# Patient Record
Sex: MALE | Race: WHITE | NOT HISPANIC OR LATINO | Employment: UNEMPLOYED | URBAN - METROPOLITAN AREA
[De-identification: names, ages, dates, MRNs, and addresses within clinical notes are randomized per-mention and may not be internally consistent; named-entity substitution may affect disease eponyms.]

---

## 2017-05-23 ENCOUNTER — OFFICE VISIT (OUTPATIENT)
Dept: ENDOCRINOLOGY | Age: 22
End: 2017-05-23

## 2017-05-23 VITALS
WEIGHT: 180.4 LBS | OXYGEN SATURATION: 96 % | BODY MASS INDEX: 25.26 KG/M2 | HEIGHT: 71 IN | SYSTOLIC BLOOD PRESSURE: 130 MMHG | HEART RATE: 89 BPM | DIASTOLIC BLOOD PRESSURE: 80 MMHG

## 2017-05-23 DIAGNOSIS — E06.3 HASHIMOTO'S DISEASE: ICD-10-CM

## 2017-05-23 DIAGNOSIS — E03.9 HYPOTHYROIDISM, UNSPECIFIED TYPE: Primary | ICD-10-CM

## 2017-05-23 DIAGNOSIS — R63.5 ABNORMAL WEIGHT GAIN: ICD-10-CM

## 2017-05-23 DIAGNOSIS — E55.9 VITAMIN D DEFICIENCY: ICD-10-CM

## 2017-05-23 DIAGNOSIS — R53.82 CHRONIC FATIGUE: ICD-10-CM

## 2017-05-23 DIAGNOSIS — E04.2 NONTOXIC MULTINODULAR GOITER: ICD-10-CM

## 2017-05-23 PROCEDURE — 99205 OFFICE O/P NEW HI 60 MIN: CPT | Performed by: INTERNAL MEDICINE

## 2017-05-23 RX ORDER — FAMOTIDINE 20 MG
1 TABLET ORAL
COMMUNITY

## 2017-05-23 RX ORDER — METHYLPHENIDATE HYDROCHLORIDE 36 MG/1
36 TABLET ORAL EVERY MORNING
COMMUNITY

## 2017-05-23 RX ORDER — LEVOTHYROXINE SODIUM 0.07 MG/1
75 TABLET ORAL DAILY
COMMUNITY
End: 2017-12-15 | Stop reason: SDUPTHER

## 2017-05-24 LAB
ALBUMIN SERPL-MCNC: 5.1 G/DL (ref 3.5–5.2)
ALBUMIN/GLOB SERPL: 2 G/DL
ALP SERPL-CCNC: 60 U/L (ref 39–117)
ALT SERPL-CCNC: 36 U/L (ref 1–41)
AST SERPL-CCNC: 26 U/L (ref 1–40)
BILIRUB SERPL-MCNC: 0.5 MG/DL (ref 0.1–1.2)
BUN SERPL-MCNC: 10 MG/DL (ref 6–20)
BUN/CREAT SERPL: 10.6 (ref 7–25)
CALCIUM SERPL-MCNC: 10.2 MG/DL (ref 8.6–10.5)
CHLORIDE SERPL-SCNC: 98 MMOL/L (ref 98–107)
CO2 SERPL-SCNC: 26.7 MMOL/L (ref 22–29)
CREAT SERPL-MCNC: 0.94 MG/DL (ref 0.76–1.27)
GLOBULIN SER CALC-MCNC: 2.5 GM/DL
GLUCOSE SERPL-MCNC: 93 MG/DL (ref 65–99)
POTASSIUM SERPL-SCNC: 4 MMOL/L (ref 3.5–5.2)
PROT SERPL-MCNC: 7.6 G/DL (ref 6–8.5)
SODIUM SERPL-SCNC: 141 MMOL/L (ref 136–145)
T3 SERPL-MCNC: 128.5 NG/DL (ref 80–200)
T4 FREE SERPL-MCNC: 1.69 NG/DL (ref 0.93–1.7)
THYROPEROXIDASE AB SERPL-ACNC: 151 IU/ML (ref 0–34)
TSH SERPL DL<=0.005 MIU/L-ACNC: 2.78 MIU/ML (ref 0.27–4.2)

## 2017-05-31 PROBLEM — R53.82 CHRONIC FATIGUE: Status: ACTIVE | Noted: 2017-05-31

## 2017-05-31 PROBLEM — E55.9 VITAMIN D DEFICIENCY: Status: ACTIVE | Noted: 2017-05-31

## 2017-11-14 ENCOUNTER — OFFICE VISIT (OUTPATIENT)
Dept: FAMILY MEDICINE CLINIC | Facility: CLINIC | Age: 22
End: 2017-11-14

## 2017-11-14 VITALS
DIASTOLIC BLOOD PRESSURE: 70 MMHG | TEMPERATURE: 98.5 F | WEIGHT: 163 LBS | OXYGEN SATURATION: 98 % | HEIGHT: 71 IN | BODY MASS INDEX: 22.82 KG/M2 | SYSTOLIC BLOOD PRESSURE: 120 MMHG | HEART RATE: 88 BPM

## 2017-11-14 DIAGNOSIS — Z00.00 HEALTH CARE MAINTENANCE: Primary | ICD-10-CM

## 2017-11-14 DIAGNOSIS — E03.9 HYPOTHYROIDISM, UNSPECIFIED TYPE: ICD-10-CM

## 2017-11-14 DIAGNOSIS — Z00.00 ANNUAL PHYSICAL EXAM: ICD-10-CM

## 2017-11-14 DIAGNOSIS — E55.9 VITAMIN D DEFICIENCY: ICD-10-CM

## 2017-11-14 DIAGNOSIS — F90.2 ADHD (ATTENTION DEFICIT HYPERACTIVITY DISORDER), COMBINED TYPE: ICD-10-CM

## 2017-11-14 PROCEDURE — 99395 PREV VISIT EST AGE 18-39: CPT | Performed by: FAMILY MEDICINE

## 2017-11-14 NOTE — PROGRESS NOTES
Subjective   Ceasar Wang is a 22 y.o. male with   Chief Complaint   Patient presents with   • Annual Exam     re-establish.  Going to be going to [place] next year for a year.   .    History of Present Illness     Patient presents today for a complete physical.  Patient sees Endocrinology for Thyroid and he sees Dr. Albarran for ADHD.  At this time, he is without further complaint. 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  He currently denies any acute complaint.    The following portions of the patient's history were reviewed and updated as appropriate: allergies, current medications, past family history, past medical history, past social history, past surgical history and problem list.    Review of Systems   Endocrine: Negative for cold intolerance and heat intolerance.        Hypothyroidism     Psychiatric/Behavioral: Positive for decreased concentration (ADHD improved with medication).   All other systems reviewed and are negative.      Objective     Vitals:    11/14/17 1324   BP: 120/70   Pulse: 88   Temp: 98.5 °F (36.9 °C)   SpO2: 98%     BP Readings from Last 3 Encounters:   11/14/17 120/70   05/23/17 130/80   12/19/13 110/70      Wt Readings from Last 3 Encounters:   11/14/17 163 lb (73.9 kg)   05/23/17 180 lb 6.4 oz (81.8 kg)   12/19/13 155 lb 0.1 oz (70.3 kg) (63 %, Z= 0.34)*     * Growth percentiles are based on [facility] 2-20 Years data.        Physical Exam   Constitutional: He is oriented to person, place, and time. Vital signs are normal. He appears well-developed and well-nourished. No distress.   HENT:   Head: Normocephalic and atraumatic.   Right Ear: Hearing, tympanic membrane, external ear and ear canal normal.   Left Ear: Hearing, tympanic membrane, external ear and ear canal normal.   Nose: Nose normal.   Mouth/Throat: Uvula is midline, oropharynx is clear and moist and mucous membranes are normal.   Eyes: Conjunctivae, EOM and lids are normal. Pupils are equal, round, and  reactive to light. No scleral icterus.   Fundoscopic exam:       The right eye shows no AV nicking, no exudate, no hemorrhage and no papilledema.        The left eye shows no AV nicking, no exudate, no hemorrhage and no papilledema.   Neck: Trachea normal and normal range of motion. Neck supple. No JVD present. No muscular tenderness present. Carotid bruit is not present. Normal range of motion present. No thyroid mass and no thyromegaly present.   Cardiovascular: Normal rate, regular rhythm, S1 normal, S2 normal, normal heart sounds, intact distal pulses and normal pulses.  PMI is not displaced.  Exam reveals no gallop and no friction rub.    No murmur heard.  Pulses:       Carotid pulses are 2+ on the right side, and 2+ on the left side.       Radial pulses are 2+ on the right side, and 2+ on the left side.   Pulmonary/Chest: Effort normal and breath sounds normal. He has no decreased breath sounds. He has no wheezes. He has no rhonchi. He has no rales.   Abdominal: Soft. Bowel sounds are normal. He exhibits no distension and no mass. There is no hepatosplenomegaly. There is no tenderness. There is no rigidity, no rebound, no guarding and no CVA tenderness. No hernia.   Musculoskeletal: Normal range of motion. He exhibits no edema, tenderness or deformity.   Lymphadenopathy:     He has no cervical adenopathy.   Neurological: He is alert and oriented to person, place, and time. He has normal strength and normal reflexes. He displays no tremor and normal reflexes. No cranial nerve deficit or sensory deficit. He exhibits normal muscle tone. He displays a negative Romberg sign. Coordination and gait normal.   Reflex Scores:       Bicep reflexes are 2+ on the right side and 2+ on the left side.       Brachioradialis reflexes are 2+ on the right side and 2+ on the left side.       Patellar reflexes are 2+ on the right side and 2+ on the left side.  Skin: Skin is warm and dry. No rash noted.   Psychiatric: He has a  normal mood and affect. His speech is normal and behavior is normal. Judgment and thought content normal. Cognition and memory are normal.   Nursing note and vitals reviewed.      Assessment/Plan   Ceasar was seen today for annual exam.    Diagnoses and all orders for this visit:    Health care maintenance  -     CBC & Differential; Future  -     Comprehensive Metabolic Panel; Future  -     Lipid Panel; Future  -     TSH; Future  -     Vitamin D 25 Hydroxy; Future    Annual physical exam  -     CBC & Differential; Future  -     Comprehensive Metabolic Panel; Future  -     Lipid Panel; Future  -     TSH; Future  -     Vitamin D 25 Hydroxy; Future    Hypothyroidism, unspecified type    ADHD (attention deficit hyperactivity disorder), combined type    Vitamin D deficiency        Return in about 1 year (around 11/14/2018) for Annual.    Scribed for Robert Ervin MD by Arun Botello. 11/14/2017    IRobert MD personally performed the services described in this documentation, as scribed by Arun Botello in my presence, and it is both accurate and complete

## 2017-12-15 RX ORDER — LEVOTHYROXINE SODIUM 0.07 MG/1
75 TABLET ORAL DAILY
Qty: 30 TABLET | Refills: 1 | Status: SHIPPED | OUTPATIENT
Start: 2017-12-15 | End: 2018-01-10 | Stop reason: SDUPTHER

## 2018-01-10 RX ORDER — LEVOTHYROXINE SODIUM 0.07 MG/1
TABLET ORAL
Qty: 30 TABLET | Refills: 0 | Status: SHIPPED | OUTPATIENT
Start: 2018-01-10 | End: 2018-02-05 | Stop reason: SDUPTHER

## 2018-02-05 RX ORDER — LEVOTHYROXINE SODIUM 0.07 MG/1
TABLET ORAL
Qty: 30 TABLET | Refills: 0 | Status: SHIPPED | OUTPATIENT
Start: 2018-02-05 | End: 2018-02-20 | Stop reason: SDUPTHER

## 2018-02-13 ENCOUNTER — LAB (OUTPATIENT)
Dept: ENDOCRINOLOGY | Age: 23
End: 2018-02-13

## 2018-02-13 DIAGNOSIS — E03.9 HYPOTHYROIDISM, UNSPECIFIED TYPE: ICD-10-CM

## 2018-02-13 DIAGNOSIS — E03.9 HYPOTHYROIDISM, UNSPECIFIED TYPE: Primary | ICD-10-CM

## 2018-02-13 LAB
ALBUMIN SERPL-MCNC: 4.5 G/DL (ref 3.5–5.2)
ALBUMIN/GLOB SERPL: 1.6 G/DL
ALP SERPL-CCNC: 51 U/L (ref 39–117)
ALT SERPL-CCNC: 19 U/L (ref 1–41)
AST SERPL-CCNC: 18 U/L (ref 1–40)
BILIRUB SERPL-MCNC: 0.3 MG/DL (ref 0.1–1.2)
BUN SERPL-MCNC: 13 MG/DL (ref 6–20)
BUN/CREAT SERPL: 15.5 (ref 7–25)
CALCIUM SERPL-MCNC: 9.8 MG/DL (ref 8.6–10.5)
CHLORIDE SERPL-SCNC: 100 MMOL/L (ref 98–107)
CO2 SERPL-SCNC: 27.6 MMOL/L (ref 22–29)
CREAT SERPL-MCNC: 0.84 MG/DL (ref 0.76–1.27)
GFR SERPLBLD CREATININE-BSD FMLA CKD-EPI: 115 ML/MIN/1.73
GFR SERPLBLD CREATININE-BSD FMLA CKD-EPI: 140 ML/MIN/1.73
GLOBULIN SER CALC-MCNC: 2.8 GM/DL
GLUCOSE SERPL-MCNC: 98 MG/DL (ref 65–99)
POTASSIUM SERPL-SCNC: 4.6 MMOL/L (ref 3.5–5.2)
PROT SERPL-MCNC: 7.3 G/DL (ref 6–8.5)
SODIUM SERPL-SCNC: 142 MMOL/L (ref 136–145)
T4 FREE SERPL-MCNC: 1.89 NG/DL (ref 0.93–1.7)
TSH SERPL DL<=0.005 MIU/L-ACNC: 2.72 MIU/ML (ref 0.27–4.2)

## 2018-02-20 ENCOUNTER — OFFICE VISIT (OUTPATIENT)
Dept: ENDOCRINOLOGY | Age: 23
End: 2018-02-20

## 2018-02-20 VITALS
BODY MASS INDEX: 22.23 KG/M2 | WEIGHT: 158.8 LBS | DIASTOLIC BLOOD PRESSURE: 70 MMHG | SYSTOLIC BLOOD PRESSURE: 118 MMHG | HEART RATE: 103 BPM | HEIGHT: 71 IN

## 2018-02-20 DIAGNOSIS — E06.3 HASHIMOTO'S DISEASE: ICD-10-CM

## 2018-02-20 DIAGNOSIS — E03.9 HYPOTHYROIDISM, UNSPECIFIED TYPE: Primary | ICD-10-CM

## 2018-02-20 DIAGNOSIS — R63.5 ABNORMAL WEIGHT GAIN: ICD-10-CM

## 2018-02-20 DIAGNOSIS — E04.2 NONTOXIC MULTINODULAR GOITER: ICD-10-CM

## 2018-02-20 DIAGNOSIS — R53.82 CHRONIC FATIGUE: ICD-10-CM

## 2018-02-20 PROCEDURE — 99214 OFFICE O/P EST MOD 30 MIN: CPT | Performed by: INTERNAL MEDICINE

## 2018-02-20 RX ORDER — LEVOTHYROXINE SODIUM 0.07 MG/1
75 TABLET ORAL DAILY
Qty: 30 TABLET | Refills: 12 | Status: SHIPPED | OUTPATIENT
Start: 2018-02-20 | End: 2019-08-01 | Stop reason: SDUPTHER

## 2018-04-04 ENCOUNTER — TELEPHONE (OUTPATIENT)
Dept: FAMILY MEDICINE CLINIC | Facility: CLINIC | Age: 23
End: 2018-04-04

## 2018-04-04 NOTE — TELEPHONE ENCOUNTER
Pt called Dr. Jefferson . He was wanting to know about getting blood work for a form that he brought in.

## 2018-04-05 NOTE — TELEPHONE ENCOUNTER
Pts employer is requesting he have some labs before going to [place] to work.  He only has paperwork as to what he needs.  Can we do that here?

## 2018-04-10 DIAGNOSIS — Z01.89 PATIENT REQUESTED DIAGNOSTIC TESTING: Primary | ICD-10-CM

## 2018-04-11 DIAGNOSIS — Z01.89 PATIENT REQUESTED DIAGNOSTIC TESTING: ICD-10-CM

## 2018-04-13 LAB
HIV 1+2 AB+HIV1 P24 AG SERPL QL IA: NON REACTIVE
RPR SER QL: NORMAL

## 2018-10-14 ENCOUNTER — RESULTS ENCOUNTER (OUTPATIENT)
Dept: FAMILY MEDICINE CLINIC | Facility: CLINIC | Age: 23
End: 2018-10-14

## 2018-10-14 DIAGNOSIS — Z00.00 HEALTH CARE MAINTENANCE: ICD-10-CM

## 2018-10-14 DIAGNOSIS — Z00.00 ANNUAL PHYSICAL EXAM: ICD-10-CM

## 2019-07-24 LAB
25(OH)D3+25(OH)D2 SERPL-MCNC: 44.2 NG/ML (ref 30–100)
ALBUMIN SERPL-MCNC: 4.9 G/DL (ref 3.5–5.2)
ALBUMIN/GLOB SERPL: 2.3 G/DL
ALP SERPL-CCNC: 53 U/L (ref 39–117)
ALT SERPL-CCNC: 8 U/L (ref 1–41)
AST SERPL-CCNC: 13 U/L (ref 1–40)
BASOPHILS # BLD AUTO: 0.02 10*3/MM3 (ref 0–0.2)
BASOPHILS NFR BLD AUTO: 0.5 % (ref 0–1.5)
BILIRUB SERPL-MCNC: 0.6 MG/DL (ref 0.2–1.2)
BUN SERPL-MCNC: 9 MG/DL (ref 6–20)
BUN/CREAT SERPL: 11.1 (ref 7–25)
CALCIUM SERPL-MCNC: 9.5 MG/DL (ref 8.6–10.5)
CHLORIDE SERPL-SCNC: 100 MMOL/L (ref 98–107)
CHOLEST SERPL-MCNC: 129 MG/DL (ref 0–200)
CO2 SERPL-SCNC: 27.5 MMOL/L (ref 22–29)
CREAT SERPL-MCNC: 0.81 MG/DL (ref 0.76–1.27)
EOSINOPHIL # BLD AUTO: 0.07 10*3/MM3 (ref 0–0.4)
EOSINOPHIL NFR BLD AUTO: 1.6 % (ref 0.3–6.2)
ERYTHROCYTE [DISTWIDTH] IN BLOOD BY AUTOMATED COUNT: 11.9 % (ref 12.3–15.4)
GLOBULIN SER CALC-MCNC: 2.1 GM/DL
GLUCOSE SERPL-MCNC: 89 MG/DL (ref 65–99)
HCT VFR BLD AUTO: 46 % (ref 37.5–51)
HDLC SERPL-MCNC: 45 MG/DL (ref 40–60)
HGB BLD-MCNC: 14.7 G/DL (ref 13–17.7)
IMM GRANULOCYTES # BLD AUTO: 0 10*3/MM3 (ref 0–0.05)
IMM GRANULOCYTES NFR BLD AUTO: 0 % (ref 0–0.5)
LDLC SERPL CALC-MCNC: 73 MG/DL (ref 0–100)
LYMPHOCYTES # BLD AUTO: 1.16 10*3/MM3 (ref 0.7–3.1)
LYMPHOCYTES NFR BLD AUTO: 26.6 % (ref 19.6–45.3)
MCH RBC QN AUTO: 29.2 PG (ref 26.6–33)
MCHC RBC AUTO-ENTMCNC: 32 G/DL (ref 31.5–35.7)
MCV RBC AUTO: 91.5 FL (ref 79–97)
MONOCYTES # BLD AUTO: 0.44 10*3/MM3 (ref 0.1–0.9)
MONOCYTES NFR BLD AUTO: 10.1 % (ref 5–12)
NEUTROPHILS # BLD AUTO: 2.67 10*3/MM3 (ref 1.7–7)
NEUTROPHILS NFR BLD AUTO: 61.2 % (ref 42.7–76)
NRBC BLD AUTO-RTO: 0 /100 WBC (ref 0–0.2)
PLATELET # BLD AUTO: 236 10*3/MM3 (ref 140–450)
POTASSIUM SERPL-SCNC: 4.4 MMOL/L (ref 3.5–5.2)
PROT SERPL-MCNC: 7 G/DL (ref 6–8.5)
RBC # BLD AUTO: 5.03 10*6/MM3 (ref 4.14–5.8)
SODIUM SERPL-SCNC: 139 MMOL/L (ref 136–145)
TRIGL SERPL-MCNC: 56 MG/DL (ref 0–150)
TSH SERPL DL<=0.005 MIU/L-ACNC: 2.32 MIU/ML (ref 0.27–4.2)
VLDLC SERPL CALC-MCNC: 11.2 MG/DL
WBC # BLD AUTO: 4.36 10*3/MM3 (ref 3.4–10.8)

## 2019-07-31 ENCOUNTER — OFFICE VISIT (OUTPATIENT)
Dept: FAMILY MEDICINE CLINIC | Facility: CLINIC | Age: 24
End: 2019-07-31

## 2019-07-31 VITALS
WEIGHT: 164.7 LBS | BODY MASS INDEX: 23.06 KG/M2 | DIASTOLIC BLOOD PRESSURE: 83 MMHG | SYSTOLIC BLOOD PRESSURE: 126 MMHG | HEART RATE: 93 BPM | OXYGEN SATURATION: 98 % | HEIGHT: 71 IN

## 2019-07-31 DIAGNOSIS — E03.9 ACQUIRED HYPOTHYROIDISM: ICD-10-CM

## 2019-07-31 DIAGNOSIS — Z00.01 ENCOUNTER FOR WELL ADULT EXAM WITH ABNORMAL FINDINGS: Primary | ICD-10-CM

## 2019-07-31 DIAGNOSIS — E55.9 VITAMIN D DEFICIENCY: ICD-10-CM

## 2019-07-31 PROCEDURE — 99395 PREV VISIT EST AGE 18-39: CPT | Performed by: FAMILY MEDICINE

## 2019-08-01 ENCOUNTER — OFFICE VISIT (OUTPATIENT)
Dept: ENDOCRINOLOGY | Age: 24
End: 2019-08-01

## 2019-08-01 VITALS
HEIGHT: 71 IN | SYSTOLIC BLOOD PRESSURE: 112 MMHG | DIASTOLIC BLOOD PRESSURE: 70 MMHG | HEART RATE: 79 BPM | WEIGHT: 163.6 LBS | BODY MASS INDEX: 22.9 KG/M2

## 2019-08-01 DIAGNOSIS — E03.9 ACQUIRED HYPOTHYROIDISM: Primary | ICD-10-CM

## 2019-08-01 DIAGNOSIS — R53.82 CHRONIC FATIGUE: ICD-10-CM

## 2019-08-01 DIAGNOSIS — E06.3 HASHIMOTO'S DISEASE: ICD-10-CM

## 2019-08-01 DIAGNOSIS — E04.2 NONTOXIC MULTINODULAR GOITER: ICD-10-CM

## 2019-08-01 DIAGNOSIS — E55.9 VITAMIN D DEFICIENCY: ICD-10-CM

## 2019-08-01 PROCEDURE — 99214 OFFICE O/P EST MOD 30 MIN: CPT | Performed by: INTERNAL MEDICINE

## 2019-08-01 RX ORDER — LEVOTHYROXINE SODIUM 0.07 MG/1
75 TABLET ORAL DAILY
Qty: 30 TABLET | Refills: 12 | Status: SHIPPED | OUTPATIENT
Start: 2019-08-01 | End: 2019-09-03 | Stop reason: SDUPTHER

## 2019-08-01 NOTE — PROGRESS NOTES
24 y.o.    Patient Care Team:  Robert Ervin DO as PCP - General (Family Medicine)    Chief Complaint:      F/U HYPOTHYROIDISM.  HERE TO DISCUSS LAB RESULTS.     Subjective     HPI   Patient is a 24-year-old white male with a history of hypothyroidism and multinodular goiter came for follow-up  Hypothyroidism  Patient is currently taking levothyroxine 75 mcg daily.  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 Is currently taking levothyroxine 75 mcg daily. He tries to take it on empty stomach for the most part.  He was diagnosed with hypothyroidism approximately 6 years ago.  He was also diagnosed with Hashimoto's thyroiditis.  Patient reports that he has been gaining weight in the past 2 months nearly 11 pounds.  He exercises on a daily basis and is reasonably active.  Fatigue.  Patient reports significant fatigue over the past several months.  He used to be on Concerta for 4 years and is still continuing from primary care.  Multinodular goiter.  Patient apparently had a thyroid ultrasound, which confirmed multinodular goiter. He also had radioactive iodine uptake and scan, which was slightly elevated     Patient denies any symptoms of hyper-or hypothyroidism at this point  Patient had an ultrasound 2 years ago along with the nuclear scan  Ultrasound reportedly  "showed a multinodular goiter  Nuclear scan had a slightly increased uptake at 39.6%        The following portions of the patient's history were reviewed and updated as appropriate: allergies, current medications, past family history, past medical history, past social history, past surgical history and problem list.    Past Medical History:   Diagnosis Date   • Fatigue    • Hashimoto's disease    • Hypothyroidism    • Lymphadenopathy      Family History   Problem Relation Age of Onset   • Diabetes Mother         gestational   • No Known Problems Father    • Aneurysm Maternal Aunt         dissecting aortic   • Diabetes Other         TYPE 2     Social History     Socioeconomic History   • Marital status: Single     Spouse name: Not on file   • Number of children: Not on file   • Years of education: Not on file   • Highest education level: Not on file   Tobacco Use   • Smoking status: Never Smoker   • Smokeless tobacco: Never Used   Substance and Sexual Activity   • Alcohol use: Yes     Comment: socially      Allergies   Allergen Reactions   • Sulfa Antibiotics        Current Outpatient Medications:   •  levothyroxine (SYNTHROID, LEVOTHROID) 75 MCG tablet, Take 1 tablet by mouth Daily., Disp: 30 tablet, Rfl: 12  •  methylphenidate (CONCERTA) 36 MG CR tablet, Take 36 mg by mouth Every Morning., Disp: , Rfl:   •  Vitamin D, Cholecalciferol, 1000 UNITS capsule, Take 1 mg by mouth., Disp: , Rfl:         Review of Systems   Constitutional: Negative for chills, fatigue and fever.   Cardiovascular: Negative for chest pain and palpitations.   Gastrointestinal: Positive for abdominal pain. Negative for constipation, diarrhea, nausea and vomiting.   Endocrine: Negative for cold intolerance and heat intolerance.   All other systems reviewed and are negative.      Objective       Vitals:    08/01/19 1445   BP: 112/70   Pulse: 79   Weight: 74.2 kg (163 lb 9.6 oz)   Height: 180.3 cm (71\")     Body mass index is 22.82 " kg/m².      Physical Exam   Constitutional: He is oriented to person, place, and time. He appears well-developed and well-nourished.   HENT:   Head: Normocephalic and atraumatic.   Eyes: EOM are normal. Pupils are equal, round, and reactive to light.   Neck: Normal range of motion. Neck supple. No thyromegaly present.   Cardiovascular: Normal rate, regular rhythm, normal heart sounds and intact distal pulses.   Pulmonary/Chest: Effort normal and breath sounds normal.   Abdominal: Soft. Bowel sounds are normal. He exhibits no distension. There is no tenderness.   Musculoskeletal: Normal range of motion. He exhibits no edema.   Neurological: He is alert and oriented to person, place, and time.   Skin: Skin is warm.   Psychiatric: He has a normal mood and affect. His behavior is normal.   Nursing note and vitals reviewed.    Results Review:     I reviewed the patient's new clinical results.    Medical records reviewed  Summary:      Results Encounter on 10/14/2018   Component Date Value Ref Range Status   • WBC 07/24/2019 4.36  3.40 - 10.80 10*3/mm3 Final   • RBC 07/24/2019 5.03  4.14 - 5.80 10*6/mm3 Final   • Hemoglobin 07/24/2019 14.7  13.0 - 17.7 g/dL Final   • Hematocrit 07/24/2019 46.0  37.5 - 51.0 % Final   • MCV 07/24/2019 91.5  79.0 - 97.0 fL Final   • MCH 07/24/2019 29.2  26.6 - 33.0 pg Final   • MCHC 07/24/2019 32.0  31.5 - 35.7 g/dL Final   • RDW 07/24/2019 11.9* 12.3 - 15.4 % Final   • Platelets 07/24/2019 236  140 - 450 10*3/mm3 Final   • Neutrophil Rel % 07/24/2019 61.2  42.7 - 76.0 % Final   • Lymphocyte Rel % 07/24/2019 26.6  19.6 - 45.3 % Final   • Monocyte Rel % 07/24/2019 10.1  5.0 - 12.0 % Final   • Eosinophil Rel % 07/24/2019 1.6  0.3 - 6.2 % Final   • Basophil Rel % 07/24/2019 0.5  0.0 - 1.5 % Final   • Neutrophils Absolute 07/24/2019 2.67  1.70 - 7.00 10*3/mm3 Final   • Lymphocytes Absolute 07/24/2019 1.16  0.70 - 3.10 10*3/mm3 Final   • Monocytes Absolute 07/24/2019 0.44  0.10 - 0.90 10*3/mm3  Final   • Eosinophils Absolute 07/24/2019 0.07  0.00 - 0.40 10*3/mm3 Final   • Basophils Absolute 07/24/2019 0.02  0.00 - 0.20 10*3/mm3 Final   • Immature Granulocyte Rel % 07/24/2019 0.0  0.0 - 0.5 % Final   • Immature Grans Absolute 07/24/2019 0.00  0.00 - 0.05 10*3/mm3 Final   • nRBC 07/24/2019 0.0  0.0 - 0.2 /100 WBC Final   • Glucose 07/24/2019 89  65 - 99 mg/dL Final   • BUN 07/24/2019 9  6 - 20 mg/dL Final   • Creatinine 07/24/2019 0.81  0.76 - 1.27 mg/dL Final   • eGFR Non  Am 07/24/2019 118  >60 mL/min/1.73 Final   • eGFR African Am 07/24/2019 143  >60 mL/min/1.73 Final   • BUN/Creatinine Ratio 07/24/2019 11.1  7.0 - 25.0 Final   • Sodium 07/24/2019 139  136 - 145 mmol/L Final   • Potassium 07/24/2019 4.4  3.5 - 5.2 mmol/L Final   • Chloride 07/24/2019 100  98 - 107 mmol/L Final   • Total CO2 07/24/2019 27.5  22.0 - 29.0 mmol/L Final   • Calcium 07/24/2019 9.5  8.6 - 10.5 mg/dL Final   • Total Protein 07/24/2019 7.0  6.0 - 8.5 g/dL Final   • Albumin 07/24/2019 4.90  3.50 - 5.20 g/dL Final   • Globulin 07/24/2019 2.1  gm/dL Final   • A/G Ratio 07/24/2019 2.3  g/dL Final   • Total Bilirubin 07/24/2019 0.6  0.2 - 1.2 mg/dL Final   • Alkaline Phosphatase 07/24/2019 53  39 - 117 U/L Final   • AST (SGOT) 07/24/2019 13  1 - 40 U/L Final   • ALT (SGPT) 07/24/2019 8  1 - 41 U/L Final   • Total Cholesterol 07/24/2019 129  0 - 200 mg/dL Final   • Triglycerides 07/24/2019 56  0 - 150 mg/dL Final   • HDL Cholesterol 07/24/2019 45  40 - 60 mg/dL Final   • VLDL Cholesterol 07/24/2019 11.2  mg/dL Final   • LDL Cholesterol  07/24/2019 73  0 - 100 mg/dL Final   • TSH 07/24/2019 2.320  0.270 - 4.200 mIU/mL Final   • 25 Hydroxy, Vitamin D 07/24/2019 44.2  30.0 - 100.0 ng/ml Final    Comment: Reference Range for Total Vitamin D 25(OH)  Deficiency <20.0 ng/mL  Insufficiency 21-29 ng/mL  Sufficiency  ng/mL  Toxicity >100 ng/ml       No results found for: HGBA1C  Lab Results   Component Value Date    CREATININE 0.81  "07/24/2019     Imaging Results (most recent)     None          Assessment and Plan:    Ceasar was seen today for hypothyroidism.    Diagnoses and all orders for this visit:    Acquired hypothyroidism    Hashimoto's disease    Nontoxic multinodular goiter    Chronic fatigue    Vitamin D deficiency    Other orders  -     levothyroxine (SYNTHROID, LEVOTHROID) 75 MCG tablet; Take 1 tablet by mouth Daily.      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 requesting for 1 year supply  Patient is not sure when he would be returning back to [place]  I advised him to call for appointment at that time     The total time spent  was more than 25 min of which greater than 15 min of time (greater than 50% of the total time)  was spent face to face with the patient counseling and coordination of care on recommended evaluation and treatment options, instructions for management/treatment and /or follow up and importance of compliance with chosen management or treatment options  Gold Dukes MD. FACE    08/01/19      EM[facility] Dragon / transcription disclaimer:     \"Dictated utilizing Dragon dictation\".         "

## 2019-09-03 RX ORDER — LEVOTHYROXINE SODIUM 0.07 MG/1
TABLET ORAL
Qty: 360 TABLET | Refills: 1 | Status: SHIPPED | OUTPATIENT
Start: 2019-09-03